# Patient Record
Sex: MALE
[De-identification: names, ages, dates, MRNs, and addresses within clinical notes are randomized per-mention and may not be internally consistent; named-entity substitution may affect disease eponyms.]

---

## 2021-02-23 ENCOUNTER — TRANSCRIPTION ENCOUNTER (OUTPATIENT)
Age: 41
End: 2021-02-23

## 2021-03-08 PROBLEM — Z00.00 ENCOUNTER FOR PREVENTIVE HEALTH EXAMINATION: Status: ACTIVE | Noted: 2021-03-08

## 2021-03-11 ENCOUNTER — APPOINTMENT (OUTPATIENT)
Dept: ORTHOPEDIC SURGERY | Facility: CLINIC | Age: 41
End: 2021-03-11
Payer: COMMERCIAL

## 2021-03-11 ENCOUNTER — TRANSCRIPTION ENCOUNTER (OUTPATIENT)
Age: 41
End: 2021-03-11

## 2021-03-11 VITALS — BODY MASS INDEX: 28.35 KG/M2 | HEIGHT: 70 IN | WEIGHT: 198 LBS

## 2021-03-11 DIAGNOSIS — S63.641A SPRAIN OF METACARPOPHALANGEAL JOINT OF RIGHT THUMB, INITIAL ENCOUNTER: ICD-10-CM

## 2021-03-11 DIAGNOSIS — M79.641 PAIN IN RIGHT HAND: ICD-10-CM

## 2021-03-11 PROCEDURE — 99203 OFFICE O/P NEW LOW 30 MIN: CPT

## 2021-03-11 PROCEDURE — 73130 X-RAY EXAM OF HAND: CPT | Mod: RT

## 2021-03-11 PROCEDURE — 99072 ADDL SUPL MATRL&STAF TM PHE: CPT

## 2021-03-12 PROBLEM — S63.641A SPRAIN OF METACARPOPHALANGEAL (MCP) JOINT OF RIGHT THUMB, INITIAL ENCOUNTER: Status: ACTIVE | Noted: 2021-03-12

## 2021-03-12 NOTE — END OF VISIT
[FreeTextEntry3] : All medical record entries made by the Scribe were at my,  Dr. Marck Suarez MD., direction and personally dictated by me on 03/11/2021. I have personally reviewed the chart and agree that the record accurately reflects my personal performance of the history, physical exam, assessment and plan.\par \par

## 2021-03-12 NOTE — HISTORY OF PRESENT ILLNESS
[Right] : right hand dominant [FreeTextEntry1] : Pt is a 42 y/o male c/o right thumb pain x 2-3 weeks.  Denies injury.  He has pain with rotation of the thumb.  It is slightly tender to touch.  He cannot fully extend the thumb without pain.  He has mild pain with pinching.  The pain has woken him from sleep.  He is not currently taking anything for pain.

## 2021-03-12 NOTE — PHYSICAL EXAM
[de-identified] : Patient is WDWN, alert, and in no acute distress. Breathing is unlabored. He is grossly oriented to person, place, \par and time. \par \par Right Hand:edema at tenderness at MCP and CMC joint\par FROM\par Sensation is normal. [de-identified] : AP, lateral and oblique views of the right hand were obtained today and revealed no abnormalities. No acute fracture. No dislocation. Cartilage spaces are maintained.

## 2021-03-12 NOTE — ADDENDUM
[FreeTextEntry1] : I, Lissette Matt wrote this note acting as a scribe for Dr. Marck Suarez on Mar 11, 2021.\par \par

## 2021-03-12 NOTE — DISCUSSION/SUMMARY
[FreeTextEntry1] : The underlying pathophysiology was reviewed with the patient. XR films were reviewed with the patient. Discussed at length the nature of the patient’s condition. Their right thumb symptoms appear secondary to sprain and possibly tendonitis.\par \par Treatment options were discussed including; observation, NSAIDS, analgesics, injection and bracing.\par \par Patient can continue activities as tolerated. All questions answered, understanding verbalized. Patient in agreement with plan of care. \par \par Follow up in 2 months if pain persists.